# Patient Record
Sex: MALE | Race: WHITE | Employment: OTHER | URBAN - NONMETROPOLITAN AREA
[De-identification: names, ages, dates, MRNs, and addresses within clinical notes are randomized per-mention and may not be internally consistent; named-entity substitution may affect disease eponyms.]

---

## 2022-05-10 ENCOUNTER — HOSPITAL ENCOUNTER (EMERGENCY)
Age: 67
Discharge: HOME | End: 2022-05-10
Payer: MEDICARE

## 2022-05-10 ENCOUNTER — APPOINTMENT (OUTPATIENT)
Dept: GENERAL RADIOLOGY | Age: 67
End: 2022-05-10
Payer: MEDICARE

## 2022-05-10 VITALS
TEMPERATURE: 98.2 F | RESPIRATION RATE: 16 BRPM | DIASTOLIC BLOOD PRESSURE: 82 MMHG | HEIGHT: 66 IN | SYSTOLIC BLOOD PRESSURE: 110 MMHG | OXYGEN SATURATION: 94 % | BODY MASS INDEX: 21.21 KG/M2 | HEART RATE: 98 BPM | WEIGHT: 132 LBS
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
## 2022-05-10 DIAGNOSIS — S40.011A CONTUSION OF RIGHT SHOULDER, INITIAL ENCOUNTER: ICD-10-CM

## 2022-05-10 PROCEDURE — 99284 EMERGENCY DEPT VISIT MOD MDM: CPT

## 2022-05-10 PROCEDURE — 73030 X-RAY EXAM OF SHOULDER: CPT

## 2022-05-10 ASSESSMENT — PAIN DESCRIPTION - PAIN TYPE: TYPE: ACUTE PAIN

## 2022-05-10 ASSESSMENT — PAIN DESCRIPTION - FREQUENCY: FREQUENCY: CONTINUOUS

## 2022-05-10 ASSESSMENT — PAIN DESCRIPTION - ORIENTATION: ORIENTATION: RIGHT

## 2022-05-10 ASSESSMENT — PAIN SCALES - GENERAL: PAINLEVEL_OUTOF10: 6

## 2022-05-10 ASSESSMENT — PAIN - FUNCTIONAL ASSESSMENT: PAIN_FUNCTIONAL_ASSESSMENT: 0-10

## 2022-05-10 ASSESSMENT — PAIN DESCRIPTION - LOCATION: LOCATION: SHOULDER

## 2022-05-10 NOTE — ED TRIAGE NOTES
Pt presents to the ED via EMS with c/o right shoulder pain. Pt reports that he had a fall yesterday. Pt denies head injury and LOC. Pt is alert and oriented x4 upon arrival. Pt rates pain at a 6/10.  VSS, respirations even and unlabored

## 2022-05-10 NOTE — ED PROVIDER NOTES
[facility] EMERGENCY DEPT      CHIEF COMPLAINT       Chief Complaint   Patient presents with    Shoulder Pain       Nurses Notes reviewed and I agree except as noted in the HPI. HISTORY OF PRESENT ILLNESS    Rosy Perales is a 78 y.o. male who presents for shoulder pain following a fall. He was standing on a stair chair yesterday and fell, landing on the stairs, and hitting his shoulder. It has been sore since. Pain reached 8/10 at 3 am this morning, at which point he took Tylenol that led to moderate improvement. Pain is currently at a 4/10. He denies dizziness, lightheadedness, back pain, neck pain, or difficulty breathing. There was no head injury or loss of consciousness. Patient denies chest pain or shortness of breath. Patient is right-hand dominant and is able to eat and write as needed. He had a chronic cough today, which is normal for him and he attributes it to extensive smoking history; he previously quit. Location/Symptom: Right shoulder pain  Timing/Onset: yesterday  Context/Settin ft fall from standing on chair, landed on stairs. Quality: ache  Duration: 1 day  Modifying Factors: Improves with Tylenol, but does not resolve. Not worsened with ROM or strength testing  Severity: 4/10    REVIEW OF SYSTEMS     Review of Systems   Constitutional: Negative for diaphoresis and fever. HENT: Negative for rhinorrhea. Eyes: Negative for photophobia. Respiratory: Positive for cough. Negative for chest tightness and shortness of breath. Cardiovascular: Negative for chest pain and palpitations. Gastrointestinal: Negative for constipation, diarrhea, nausea and vomiting. Genitourinary: Negative for dysuria and frequency. Musculoskeletal: Positive for arthralgias. Negative for back pain, joint swelling and neck pain. Skin: Negative for rash and wound. Neurological: Negative for weakness and numbness. Psychiatric/Behavioral: Negative for confusion.         PAST MEDICAL HISTORY has a past medical history of Arthritis, CAD (coronary artery disease), Hyperlipidemia, and Hypertension. SURGICAL HISTORY      has a past surgical history that includes hernia repair; Hemorrhoid surgery (1970'S); Arm Surgery (Right, 1970'S); Cardiac surgery (2005); pacemaker placement (2005?); and Colonoscopy (2015).     CURRENT MEDICATIONS       Discharge Medication List as of 5/10/2022  1:51 PM      CONTINUE these medications which have NOT CHANGED    Details   famotidine (PEPCID) 40 MG tablet Take 1 tablet by mouth nightly, Disp-30 tablet, R-11      omeprazole (PRILOSEC) 20 MG delayed release capsule Take 1 capsule by mouth every morning (before breakfast), Disp-30 capsule, R-11      amLODIPine (NORVASC) 5 MG tablet Take 5 mg by mouth daily      Cholecalciferol 2000 UNITS TABS Take by mouth      vitamin B-12 (CYANOCOBALAMIN) 100 MCG tablet Take 50 mcg by mouth daily      Dextromethorphan-guaiFENesin  MG CAPS Take by mouth      DIGOXIN PO Take 0.125 mg by mouth daily      docusate sodium (COLACE) 100 MG capsule Take 100 mg by mouth 2 times daily      fluticasone-salmeterol (ADVAIR) 250-50 MCG/DOSE AEPB Inhale 1 puff into the lungs every 12 hours      gabapentin (NEURONTIN) 300 MG capsule Take 300 mg by mouth 3 times daily      gemfibrozil (LOPID) 600 MG tablet Take 600 mg by mouth 2 times daily (before meals)      levothyroxine (SYNTHROID) 88 MCG tablet Take 88 mcg by mouth Daily      loratadine (CLARITIN) 10 MG tablet Take 10 mg by mouth daily      metoprolol tartrate (LOPRESSOR) 25 MG tablet Take 12.5 mg by mouth 2 times daily      ORPHENADRINE CITRATE PO Take 100 mg by mouth 2 times daily      polyethylene glycol (GLYCOLAX) powder Take 17 g by mouth daily      temazepam (RESTORIL) 15 MG capsule Take 15 mg by mouth nightly as needed for Sleep      tiotropium (SPIRIVA) 18 MCG inhalation capsule Inhale 18 mcg into the lungs daily      valsartan-hydrochlorothiazide (DIOVAN-HCT) 320-25 MG per tablet Take 1 tablet by mouth daily      captopril (CAPOTEN) 25 MG tablet Take 12.5 mg by mouth daily       atorvastatin (LIPITOR) 20 MG tablet Take 20 mg by mouth daily. aspirin 81 MG EC tablet Take 81 mg by mouth daily. nitroGLYCERIN 2.5 MG CR capsule Take 2.5 mg by mouth 2 times daily. carvedilol (COREG) 6.25 MG tablet Take 3.125 mg by mouth 2 times daily       isosorbide mononitrate (IMDUR) 30 MG CR tablet Take 30 mg by mouth daily. sertraline (ZOLOFT) 50 MG tablet Take 50 mg by mouth daily. tamsulosin (FLOMAX) 0.4 MG capsule Take 0.4 mg by mouth nightly. ALLERGIES     has No Known Allergies. FAMILY HISTORY     He indicated that his mother is . He indicated that his father is . He indicated that his sister is alive. He indicated that only one of his two brothers is alive. He indicated that the status of his neg hx is unknown.   family history includes Cancer in his father and sister; Diabetes in his mother; Heart Disease in his brother, father, mother, and sister; Stroke in his father. SOCIAL HISTORY    reports that he has been smoking. He has a 132.00 pack-year smoking history. He has never used smokeless tobacco. He reports that he does not drink alcohol and does not use drugs. PHYSICAL EXAM     INITIAL VITALS:  height is 5' 6\" (1.676 m) and weight is 132 lb (59.9 kg). His oral temperature is 98.2 °F (36.8 °C). His blood pressure is 110/82 and his pulse is 98. His respiration is 16 and oxygen saturation is 94%. Physical Exam  Vitals and nursing note reviewed. Constitutional:       General: He is not in acute distress. Appearance: Normal appearance. He is well-developed. He is not toxic-appearing or diaphoretic. Interventions: Nasal cannula in place. HENT:      Head: Normocephalic and atraumatic. Right Ear: Hearing normal.      Left Ear: Hearing normal.      Nose: Nose normal. No rhinorrhea. Mouth/Throat:      Pharynx: Uvula midline.  No oropharyngeal exudate. Eyes:      General: Lids are normal. No scleral icterus. Conjunctiva/sclera: Conjunctivae normal.      Pupils: Pupils are equal, round, and reactive to light. Neck:      Trachea: No tracheal deviation. Cardiovascular:      Rate and Rhythm: Normal rate and regular rhythm. Pulses:           Radial pulses are 2+ on the right side and 2+ on the left side. Heart sounds: Normal heart sounds. No murmur heard. Pulmonary:      Effort: Pulmonary effort is normal. No tachypnea or respiratory distress. Breath sounds: Normal breath sounds. No stridor. No decreased breath sounds or wheezing. Abdominal:      General: There is no distension. Palpations: Abdomen is soft. Abdomen is not rigid. Tenderness: There is no abdominal tenderness. There is no guarding. Musculoskeletal:         General: Normal range of motion. Right shoulder: Normal.      Right upper arm: Normal.      Right elbow: Normal.      Right forearm: Normal.      Right wrist: Normal.        Arms:       Cervical back: Normal range of motion and neck supple. No rigidity. Lymphadenopathy:      Cervical: No cervical adenopathy. Skin:     General: Skin is warm and dry. Coloration: Skin is not pale. Findings: Lesion (abrasion right shoulder) present. No abrasion, ecchymosis or rash. Neurological:      Mental Status: He is alert and oriented to person, place, and time. GCS: GCS eye subscore is 4. GCS verbal subscore is 5. GCS motor subscore is 6. Sensory: No sensory deficit. Gait: Gait normal.      Comments: No gross deficits observed   Psychiatric:         Mood and Affect: Mood normal.         Speech: Speech normal.         Behavior: Behavior normal.         Thought Content: Thought content normal.         DIFFERENTIAL DIAGNOSIS:   Including but not limited to:  Shoulder contusion, humeral head fracture, sprain, abrasion    DIAGNOSTIC RESULTS     EKG: All E[facility]'s are [facility] who either signs or Co-signs this chart in the absence of a cardiologist.  None    RADIOLOGY: non-plain film images(s) such as CT,Ultrasound and MRI are read by the radiologist.  Plain radiographic images are visualized and preliminarily interpreted by the emergency physician unless otherwise stated below. XR SHOULDER RIGHT (MIN 2 VIEWS)   Final Result   1. No acute bony abnormality. 2. Status post plate and screw fixation of the proximal humeral diaphysis. **This report has been created using voice recognition software. It may contain minor errors which are inherent in voice recognition technology. **      Final report electronically signed by Dr Jarvis Garcia on 5/10/2022 12:33 PM          LABS:   Labs Reviewed - No data to display    EMERGENCY DEPARTMENT COURSE:   Vitals:    Vitals:    05/10/22 1202 05/10/22 1400   BP: 104/75 110/82   Pulse: 92 98   Resp: 16 16   Temp: 98.2 °F (36.8 °C)    TempSrc: Oral    SpO2: 94% 94%   Weight: 132 lb (59.9 kg)    Height: 5' 6\" (1.676 m)        Patient was seen in the emergency department during the global pandemic, when there was surge capacity and regional healthcare crisis. MDM:  The patient was seen and evaluated within the ED today for shoulder pain. On exam, I appreciated no acute findings. There was abrasion to the shoulder but full range of motion. No other signs of trauma. Old records were reviewed. Within the department, I observed the patient's vital signs to be within acceptable range. Radiological studies within the department revealed no evidence of fracture or acute bony abnormality. Within the department the patient was monitored. I observed the patient's condition to modestly remained stable during the duration of their stay. On re-exam patient remained neurovascularly intact. Vital signs remained stable. The patient remained non-toxic appearing with no distress evident in the ER.  The patient was comfortable with the plan of discharge home and to follow up with his PCP. Anticipatory guidance was given. The patient was instructed to return to the emergency department for any worsening of their symptoms. Patient was discharged from the emergency department in good condition with all questions answered. See disposition below. I have given the patient strict written and verbal instructions about care at home, follow-up, and signs and symptoms of worsening of condition and they did verbalize understanding. CRITICAL CARE:   None    CONSULTS:  None    PROCEDURES:  None    FINAL IMPRESSION      1. Fall involving chair as cause of accidental injury in home as place of occurrence, initial encounter    2. Contusion of right shoulder, initial encounter          DISPOSITION/PLAN     1. Fall involving chair as cause of accidental injury in home as place of occurrence, initial encounter    2.  Contusion of right shoulder, initial encounter        PATIENT REFERRED TO:  MD Yaniv Salazar [address] [place] [address]  [phone]    Schedule an appointment as soon as possible for a visit in 3 days        DISCHARGE MEDICATIONS:  Discharge Medication List as of 5/10/2022  1:51 PM          (Please note that portions of this note were completed with a voice recognition program.  Efforts were made to edit the dictations but occasionally words are mis-transcribed.)    Dany Tony PA-C 05/10/22 3:06 PM    ELODIA Aguilar PA-C  05/10/22 Prisca Wade PA-C  05/20/22 2007

## 2022-05-10 NOTE — ED NOTES
Pt resting in bed with call light in reach, no distress noted at this time as pt breaths easy and unlabored.       [facility]  05/10/22 9416

## 2022-05-20 ASSESSMENT — ENCOUNTER SYMPTOMS
VOMITING: 0
BACK PAIN: 0
DIARRHEA: 0
COUGH: 1
SHORTNESS OF BREATH: 0
CHEST TIGHTNESS: 0
CONSTIPATION: 0
NAUSEA: 0
RHINORRHEA: 0
PHOTOPHOBIA: 0